# Patient Record
Sex: FEMALE | Race: WHITE | Employment: STUDENT | URBAN - METROPOLITAN AREA
[De-identification: names, ages, dates, MRNs, and addresses within clinical notes are randomized per-mention and may not be internally consistent; named-entity substitution may affect disease eponyms.]

---
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
## 2023-01-11 DIAGNOSIS — R55 VASOVAGAL NEAR SYNCOPE: ICD-10-CM

## 2023-02-05 DIAGNOSIS — R55 VASOVAGAL NEAR SYNCOPE: ICD-10-CM

## 2023-02-17 ENCOUNTER — APPOINTMENT (OUTPATIENT)
Dept: PEDIATRIC CARDIOLOGY | Age: 18
End: 2023-02-17

## 2023-02-17 ENCOUNTER — OFFICE VISIT (OUTPATIENT)
Dept: OBGYN CLINIC | Facility: CLINIC | Age: 18
End: 2023-02-17

## 2023-02-17 VITALS
BODY MASS INDEX: 19.67 KG/M2 | DIASTOLIC BLOOD PRESSURE: 81 MMHG | WEIGHT: 111 LBS | HEIGHT: 63 IN | HEART RATE: 96 BPM | SYSTOLIC BLOOD PRESSURE: 120 MMHG

## 2023-02-17 DIAGNOSIS — N64.4 BREAST PAIN IN FEMALE: Primary | ICD-10-CM

## 2023-02-17 DIAGNOSIS — Z11.3 SCREEN FOR STD (SEXUALLY TRANSMITTED DISEASE): ICD-10-CM

## 2023-02-17 DIAGNOSIS — Z30.09 BIRTH CONTROL COUNSELING: ICD-10-CM

## 2023-02-17 PROCEDURE — 3074F SYST BP LT 130 MM HG: CPT | Performed by: ADVANCED PRACTICE MIDWIFE

## 2023-02-17 PROCEDURE — 3079F DIAST BP 80-89 MM HG: CPT | Performed by: ADVANCED PRACTICE MIDWIFE

## 2023-02-17 PROCEDURE — 3008F BODY MASS INDEX DOCD: CPT | Performed by: ADVANCED PRACTICE MIDWIFE

## 2023-02-17 PROCEDURE — 99213 OFFICE O/P EST LOW 20 MIN: CPT | Performed by: ADVANCED PRACTICE MIDWIFE

## 2023-02-17 RX ORDER — DESOGESTREL AND ETHINYL ESTRADIOL 21-5 (28)
1 KIT ORAL DAILY
Qty: 28 EACH | Refills: 2 | Status: SHIPPED | OUTPATIENT
Start: 2023-02-17 | End: 2024-02-17

## 2023-02-20 ENCOUNTER — OFFICE VISIT (OUTPATIENT)
Dept: PEDIATRIC CARDIOLOGY | Age: 18
End: 2023-02-20

## 2023-02-20 VITALS
SYSTOLIC BLOOD PRESSURE: 109 MMHG | HEIGHT: 64 IN | WEIGHT: 109.57 LBS | BODY MASS INDEX: 18.71 KG/M2 | HEART RATE: 93 BPM | OXYGEN SATURATION: 98 % | DIASTOLIC BLOOD PRESSURE: 62 MMHG

## 2023-02-20 DIAGNOSIS — R55 VASOVAGAL NEAR SYNCOPE: ICD-10-CM

## 2023-02-20 LAB
C TRACH DNA SPEC QL NAA+PROBE: NEGATIVE
N GONORRHOEA DNA SPEC QL NAA+PROBE: NEGATIVE

## 2023-02-20 PROCEDURE — 99214 OFFICE O/P EST MOD 30 MIN: CPT | Performed by: PEDIATRICS

## 2023-02-20 RX ORDER — MIDODRINE HYDROCHLORIDE 5 MG/1
5 TABLET ORAL 3 TIMES DAILY
Qty: 150 TABLET | Refills: 11 | Status: SHIPPED | OUTPATIENT
Start: 2023-02-20 | End: 2023-08-24 | Stop reason: HOSPADM

## 2023-02-20 ASSESSMENT — PAIN SCALES - GENERAL: PAINLEVEL: 0

## 2023-03-28 NOTE — TELEPHONE ENCOUNTER
Mom asking for any advice regarding Covid which her daughter was positive on 12/28. Patient currently with cough, headache, congestion and low grade fever. Advised rest, fluids, alternating Ibuprofen with Tylenol.   Advised UC/ER for worsening symptoms an
show

## 2023-04-13 DIAGNOSIS — R55 VASOVAGAL NEAR SYNCOPE: ICD-10-CM

## 2023-05-26 ENCOUNTER — OFFICE VISIT (OUTPATIENT)
Dept: OBGYN CLINIC | Facility: CLINIC | Age: 18
End: 2023-05-26

## 2023-05-26 VITALS
WEIGHT: 113 LBS | DIASTOLIC BLOOD PRESSURE: 78 MMHG | BODY MASS INDEX: 20 KG/M2 | HEART RATE: 98 BPM | SYSTOLIC BLOOD PRESSURE: 125 MMHG

## 2023-05-26 DIAGNOSIS — I47.1 SVT (SUPRAVENTRICULAR TACHYCARDIA) (HCC): ICD-10-CM

## 2023-05-26 DIAGNOSIS — J45.40 MODERATE PERSISTENT ASTHMA WITHOUT COMPLICATION: ICD-10-CM

## 2023-05-26 DIAGNOSIS — N92.6 MISSED MENSES: Primary | ICD-10-CM

## 2023-05-26 DIAGNOSIS — I34.1 MITRAL VALVE PROLAPSE: ICD-10-CM

## 2023-05-26 PROBLEM — I47.10 SUPRAVENTRICULAR TACHYCARDIA (HCC): Status: ACTIVE | Noted: 2019-04-01

## 2023-05-26 PROBLEM — I47.10 SUPRAVENTRICULAR TACHYCARDIA: Status: ACTIVE | Noted: 2019-04-01

## 2023-05-26 LAB
CONTROL LINE PRESENT WITH A CLEAR BACKGROUND (YES/NO): YES YES/NO
KIT LOT #: NORMAL NUMERIC
PREGNANCY TEST, URINE: POSITIVE

## 2023-05-26 PROCEDURE — 81025 URINE PREGNANCY TEST: CPT | Performed by: ADVANCED PRACTICE MIDWIFE

## 2023-05-26 PROCEDURE — 99212 OFFICE O/P EST SF 10 MIN: CPT | Performed by: ADVANCED PRACTICE MIDWIFE

## 2023-05-26 PROCEDURE — 3078F DIAST BP <80 MM HG: CPT | Performed by: ADVANCED PRACTICE MIDWIFE

## 2023-05-26 PROCEDURE — 3074F SYST BP LT 130 MM HG: CPT | Performed by: ADVANCED PRACTICE MIDWIFE

## 2023-05-26 RX ORDER — PNV NO.95/FERROUS FUM/FOLIC AC 28MG-0.8MG
1 TABLET ORAL DAILY
Qty: 90 TABLET | Refills: 3 | Status: SHIPPED | OUTPATIENT
Start: 2023-05-26 | End: 2023-06-25

## 2023-06-02 DIAGNOSIS — R55 VASOVAGAL NEAR SYNCOPE: ICD-10-CM

## 2023-06-28 ENCOUNTER — TELEPHONE (OUTPATIENT)
Dept: PEDIATRIC CARDIOLOGY | Age: 18
End: 2023-06-28

## 2023-08-21 ENCOUNTER — OFFICE VISIT (OUTPATIENT)
Dept: PEDIATRIC CARDIOLOGY | Age: 18
End: 2023-08-21

## 2023-08-21 VITALS
OXYGEN SATURATION: 98 % | WEIGHT: 122.47 LBS | HEART RATE: 86 BPM | HEIGHT: 64 IN | SYSTOLIC BLOOD PRESSURE: 117 MMHG | DIASTOLIC BLOOD PRESSURE: 65 MMHG | BODY MASS INDEX: 20.91 KG/M2

## 2023-08-21 DIAGNOSIS — R55 VASOVAGAL NEAR SYNCOPE: Primary | ICD-10-CM

## 2023-08-21 PROCEDURE — 99214 OFFICE O/P EST MOD 30 MIN: CPT | Performed by: PEDIATRICS

## 2023-08-21 PROCEDURE — 93000 ELECTROCARDIOGRAM COMPLETE: CPT | Performed by: PEDIATRICS

## 2023-08-21 ASSESSMENT — PAIN SCALES - GENERAL: PAINLEVEL: 0

## 2023-11-27 ENCOUNTER — EXTERNAL RECORD (OUTPATIENT)
Dept: HEALTH INFORMATION MANAGEMENT | Facility: OTHER | Age: 18
End: 2023-11-27

## 2023-12-01 ENCOUNTER — OFFICE VISIT (OUTPATIENT)
Dept: PEDIATRIC CARDIOLOGY | Age: 18
End: 2023-12-01

## 2023-12-01 ENCOUNTER — CLINICAL ABSTRACT (OUTPATIENT)
Dept: HEALTH INFORMATION MANAGEMENT | Facility: OTHER | Age: 18
End: 2023-12-01

## 2023-12-01 VITALS
OXYGEN SATURATION: 100 % | SYSTOLIC BLOOD PRESSURE: 124 MMHG | WEIGHT: 137.35 LBS | HEART RATE: 104 BPM | BODY MASS INDEX: 23.45 KG/M2 | HEIGHT: 64 IN | DIASTOLIC BLOOD PRESSURE: 71 MMHG

## 2023-12-01 DIAGNOSIS — R55 VASOVAGAL NEAR SYNCOPE: Primary | ICD-10-CM

## 2023-12-01 PROCEDURE — 93000 ELECTROCARDIOGRAM COMPLETE: CPT | Performed by: PEDIATRICS

## 2023-12-01 PROCEDURE — 99214 OFFICE O/P EST MOD 30 MIN: CPT | Performed by: PEDIATRICS

## 2024-03-16 ENCOUNTER — HOSPITAL ENCOUNTER (EMERGENCY)
Facility: HOSPITAL | Age: 19
Discharge: HOME | End: 2024-03-16
Attending: EMERGENCY MEDICINE
Payer: MEDICAID

## 2024-03-16 VITALS
RESPIRATION RATE: 20 BRPM | TEMPERATURE: 98 F | OXYGEN SATURATION: 96 % | WEIGHT: 118.19 LBS | DIASTOLIC BLOOD PRESSURE: 80 MMHG | HEART RATE: 88 BPM | BODY MASS INDEX: 21 KG/M2 | SYSTOLIC BLOOD PRESSURE: 137 MMHG

## 2024-03-16 DIAGNOSIS — R59.1 LYMPHADENOPATHY: ICD-10-CM

## 2024-03-16 DIAGNOSIS — J01.90 ACUTE SINUSITIS, RECURRENCE NOT SPECIFIED, UNSPECIFIED LOCATION: ICD-10-CM

## 2024-03-16 DIAGNOSIS — J06.9 UPPER RESPIRATORY TRACT INFECTION, UNSPECIFIED TYPE: Primary | ICD-10-CM

## 2024-03-16 PROCEDURE — 99283 EMERGENCY DEPT VISIT LOW MDM: CPT

## 2024-03-16 RX ORDER — AZITHROMYCIN 250 MG/1
TABLET, FILM COATED ORAL
Qty: 6 TABLET | Refills: 0 | Status: SHIPPED | OUTPATIENT
Start: 2024-03-16 | End: 2024-03-21

## 2024-03-16 NOTE — ED PROVIDER NOTES
Patient Seen in: [facility] Emergency Department    History     Chief Complaint   Patient presents with    Swelling       HPI    18-year-old female who presents here today with her mother, presenting with bumps to her throat and the back of her neck.  Patient she been having runny nose and sore throat for the past few days as well.  Patient she has had these before they come and go.  No injury or trauma.    History reviewed.   Past Medical History:   Diagnosis Date    Acne vulgaris 5/28/2020    Anxiety 1/27/2022    Asthma (HCC)     Depressive disorder 1/27/2022    Headache disorder 9/13/2019    Hyperopia of both eyes 9/13/2019    Mitral valve prolapse     Poor posture 1/23/2020    SVT (supraventricular tachycardia)        History reviewed.   Past Surgical History:   Procedure Laterality Date    ABLATION  2019         Medications :  (Not in a hospital admission)       Family History   Problem Relation Age of Onset    Heart Attack Maternal Grandmother 64    Glaucoma Maternal Grandfather     Glaucoma Paternal Grandmother     Macular degeneration Neg        Smoking Status:   Social History     Socioeconomic History    Marital status: Single   Tobacco Use    Smoking status: Never    Smokeless tobacco: Never    Tobacco comments:     Mother denies passive smoke exposure in the home.    Vaping Use    Vaping Use: Never used   Substance and Sexual Activity    Alcohol use: No    Drug use: No   Other Topics Concern    Second-hand smoke exposure No    Alcohol/drug concerns No    Violence concerns No       Constitutional and vital signs reviewed.      Social History and Family History elements reviewed from today, pertinent positives to the presenting problem noted.    Physical Exam     ED Triage Vitals [03/16/24 0241]   /80   Pulse 88   Resp 20   Temp 97.5 °F (36.4 °C)   Temp src    SpO2 96 %   O2 Device None (Room air)       All measures to prevent infection transmission during my interaction with the patient  were taken. Handwashing was performed prior to and after the exam.  Stethoscope and any equipment used during my examination was cleaned with super sani-cloth germicidal wipes following the exam.     Physical Exam  Vitals reviewed.   Constitutional:       Appearance: Normal appearance.   HENT:      Head: Normocephalic.      Comments: 1 palpable lymph node in the right mid posterior cervical chain.  1 palpable lymph node in the right submental area.  Both are mobile and soft.     Nose: Congestion present.      Mouth/Throat:      Mouth: Mucous membranes are dry.      Comments: Postnasal drip.  No pharyngeal erythema pain oropharynx  Eyes:      Pupils: Pupils are equal, round, and reactive to light.   Cardiovascular:      Rate and Rhythm: Regular rhythm.      Heart sounds: Normal heart sounds.   Pulmonary:      Breath sounds: Normal breath sounds.   Musculoskeletal:         General: Normal range of motion.   Skin:     General: Skin is warm and dry.   Neurological:      General: No focal deficit present.      Mental Status: She is alert.         ED Course      Labs Reviewed - No data to display    As Interpreted by me    Imaging Results Available and Reviewed while in ED: No results found.  ED Medications Administered: Medications - No data to display      MDM     Vitals:    03/16/24 0237 03/16/24 0241   BP:  137/80   Pulse:  88   Resp:  20   Temp:  97.5 °F (36.4 °C)   SpO2:  96%   Weight: 53.6 kg      *I personally reviewed and interpreted all ED vitals.    Pulse Ox: 96%, Room air, Normal     Differential Diagnosis/ Diagnostic Considerations: Lymph nodes, abscess, mass.    Complicating Factors: The patient already has does not have any pertinent problems on file. to contribute to the complexity of this ED evaluation.    Medical Decision Making    I did explain to the patient and her mother-in-law that both when she notices her palpable lymph nodes.  Patient does have upper respiratory infection which is causing the  lymphadenopathy.  Will discharge the patient home with a Z-Brea.  They do understand to follow-up with her primary care provider 1 to 2 days.  Return to the ER if symptoms continue, get worse, unable to follow-up  Condition upon leaving the department: Stable    Disposition and Plan     Clinical Impression:  1. Upper respiratory tract infection, unspecified type    2. Lymphadenopathy        Disposition:  Discharge    Follow-up:  Felipe Boss DO  [address] [place] [address]  [phone]    Follow up        Medications Prescribed:  Current Discharge Medication List        START taking these medications    Details   azithromycin (ZITHROMAX Z-BREA) 250 MG Oral Tab 500 mg once followed by 250 mg daily x 4 days  Qty: 6 tablet, Refills: 0

## 2024-07-05 ENCOUNTER — APPOINTMENT (OUTPATIENT)
Dept: PEDIATRIC CARDIOLOGY | Age: 19
End: 2024-07-05

## 2024-09-02 NOTE — TELEPHONE ENCOUNTER
KALEIGH- Please call and schedule an appointment for patient for a nurse visit 2nd HPV.
Pt's mom called in requesting an appt for pt to receive her second dose of HPV with an RN. Please advise.
Talked to mom of pt appt made.
76

## 2024-11-07 ENCOUNTER — TELEPHONE (OUTPATIENT)
Dept: CARDIOLOGY | Age: 19
End: 2024-11-07

## 2024-11-14 ENCOUNTER — APPOINTMENT (OUTPATIENT)
Age: 19
End: 2024-11-14

## 2024-11-14 ENCOUNTER — ANCILLARY PROCEDURE (OUTPATIENT)
Age: 19
End: 2024-11-14
Attending: INTERNAL MEDICINE

## 2024-11-14 VITALS
DIASTOLIC BLOOD PRESSURE: 70 MMHG | WEIGHT: 115 LBS | SYSTOLIC BLOOD PRESSURE: 134 MMHG | HEART RATE: 83 BPM | BODY MASS INDEX: 19.63 KG/M2 | HEIGHT: 64 IN

## 2024-11-14 DIAGNOSIS — I47.10 SVT (SUPRAVENTRICULAR TACHYCARDIA) (CMD): ICD-10-CM

## 2024-11-14 DIAGNOSIS — R55 SYNCOPE AND COLLAPSE: ICD-10-CM

## 2024-11-14 DIAGNOSIS — R55 SYNCOPE AND COLLAPSE: Primary | ICD-10-CM

## 2024-11-14 LAB
ATRIAL RATE (BPM): 84
P AXIS (DEGREES): 73
PR-INTERVAL (MSEC): 200
QRS-INTERVAL (MSEC): 72
QT-INTERVAL (MSEC): 364
QTC: 430
R AXIS (DEGREES): 68
REPORT TEXT: NORMAL
T AXIS (DEGREES): 57
VENTRICULAR RATE EKG/MIN (BPM): 84

## 2024-11-14 RX ORDER — METOPROLOL TARTRATE 25 MG/1
25 TABLET, FILM COATED ORAL 2 TIMES DAILY
Qty: 60 TABLET | Refills: 1 | Status: SHIPPED | OUTPATIENT
Start: 2024-11-14

## 2024-11-14 ASSESSMENT — PATIENT HEALTH QUESTIONNAIRE - PHQ9
1. LITTLE INTEREST OR PLEASURE IN DOING THINGS: NOT AT ALL
CLINICAL INTERPRETATION OF PHQ2 SCORE: NO FURTHER SCREENING NEEDED
2. FEELING DOWN, DEPRESSED OR HOPELESS: NOT AT ALL
SUM OF ALL RESPONSES TO PHQ9 QUESTIONS 1 AND 2: 0
SUM OF ALL RESPONSES TO PHQ9 QUESTIONS 1 AND 2: 0

## 2024-11-15 ENCOUNTER — TELEPHONE (OUTPATIENT)
Dept: CARDIOLOGY | Age: 19
End: 2024-11-15

## 2024-12-04 ENCOUNTER — TELEPHONE (OUTPATIENT)
Dept: CARDIOLOGY | Age: 19
End: 2024-12-04

## 2024-12-05 ENCOUNTER — HOSPITAL ENCOUNTER (EMERGENCY)
Facility: HOSPITAL | Age: 19
Discharge: HOME | End: 2024-12-05
Attending: EMERGENCY MEDICINE
Payer: MEDICAID

## 2024-12-05 ENCOUNTER — APPOINTMENT (OUTPATIENT)
Dept: CT IMAGING | Facility: HOSPITAL | Age: 19
End: 2024-12-05
Attending: EMERGENCY MEDICINE
Payer: MEDICAID

## 2024-12-05 ENCOUNTER — APPOINTMENT (OUTPATIENT)
Dept: ULTRASOUND IMAGING | Facility: HOSPITAL | Age: 19
End: 2024-12-05
Attending: EMERGENCY MEDICINE
Payer: MEDICAID

## 2024-12-05 VITALS
WEIGHT: 114 LBS | SYSTOLIC BLOOD PRESSURE: 119 MMHG | BODY MASS INDEX: 19.46 KG/M2 | RESPIRATION RATE: 16 BRPM | DIASTOLIC BLOOD PRESSURE: 76 MMHG | HEIGHT: 64 IN | OXYGEN SATURATION: 99 % | TEMPERATURE: 99 F | HEART RATE: 76 BPM

## 2024-12-05 DIAGNOSIS — N83.201 RIGHT OVARIAN CYST: Primary | ICD-10-CM

## 2024-12-05 LAB
ANION GAP SERPL CALC-SCNC: 8 MMOL/L (ref 0–18)
BASOPHILS # BLD AUTO: 0.03 X10(3) UL (ref 0–0.2)
BASOPHILS NFR BLD AUTO: 0.5 %
BILIRUB UR QL: NEGATIVE
BUN BLD-MCNC: 18 MG/DL (ref 9–23)
BUN/CREAT SERPL: 19.1 (ref 10–20)
CALCIUM BLD-MCNC: 9.5 MG/DL (ref 8.7–10.4)
CHLORIDE SERPL-SCNC: 107 MMOL/L (ref 98–112)
CLARITY UR: CLEAR
CO2 SERPL-SCNC: 27 MMOL/L (ref 21–32)
COLOR UR: COLORLESS
CREAT BLD-MCNC: 0.94 MG/DL
DEPRECATED RDW RBC AUTO: 39.7 FL (ref 35.1–46.3)
EGFRCR SERPLBLD CKD-EPI 2021: 89 ML/MIN/1.73M2 (ref 60–?)
EOSINOPHIL # BLD AUTO: 0.17 X10(3) UL (ref 0–0.7)
EOSINOPHIL NFR BLD AUTO: 2.7 %
ERYTHROCYTE [DISTWIDTH] IN BLOOD BY AUTOMATED COUNT: 12.3 % (ref 11–15)
GLUCOSE BLD-MCNC: 87 MG/DL (ref 70–99)
GLUCOSE UR-MCNC: NORMAL MG/DL
HCG SERPL QL: NEGATIVE
HCT VFR BLD AUTO: 38.1 %
HGB BLD-MCNC: 12.7 G/DL
IMM GRANULOCYTES # BLD AUTO: 0.02 X10(3) UL (ref 0–1)
IMM GRANULOCYTES NFR BLD: 0.3 %
KETONES UR-MCNC: NEGATIVE MG/DL
LEUKOCYTE ESTERASE UR QL STRIP.AUTO: NEGATIVE
LYMPHOCYTES # BLD AUTO: 1.67 X10(3) UL (ref 1–4)
LYMPHOCYTES NFR BLD AUTO: 26.3 %
MCH RBC QN AUTO: 29.1 PG (ref 26–34)
MCHC RBC AUTO-ENTMCNC: 33.3 G/DL (ref 31–37)
MCV RBC AUTO: 87.4 FL
MONOCYTES # BLD AUTO: 0.51 X10(3) UL (ref 0.1–1)
MONOCYTES NFR BLD AUTO: 8 %
NEUTROPHILS # BLD AUTO: 3.95 X10 (3) UL (ref 1.5–7.7)
NEUTROPHILS # BLD AUTO: 3.95 X10(3) UL (ref 1.5–7.7)
NEUTROPHILS NFR BLD AUTO: 62.2 %
NITRITE UR QL STRIP.AUTO: NEGATIVE
OSMOLALITY SERPL CALC.SUM OF ELEC: 295 MOSM/KG (ref 275–295)
PH UR: 6.5 [PH] (ref 5–8)
PLATELET # BLD AUTO: 232 10(3)UL (ref 150–450)
POTASSIUM SERPL-SCNC: 4.3 MMOL/L (ref 3.5–5.1)
PROT UR-MCNC: NEGATIVE MG/DL
RBC # BLD AUTO: 4.36 X10(6)UL
SODIUM SERPL-SCNC: 142 MMOL/L (ref 136–145)
SP GR UR STRIP: <1.005 (ref 1–1.03)
UROBILINOGEN UR STRIP-ACNC: NORMAL
WBC # BLD AUTO: 6.4 X10(3) UL (ref 4–11)

## 2024-12-05 PROCEDURE — 84703 CHORIONIC GONADOTROPIN ASSAY: CPT | Performed by: EMERGENCY MEDICINE

## 2024-12-05 PROCEDURE — 76856 US EXAM PELVIC COMPLETE: CPT | Performed by: EMERGENCY MEDICINE

## 2024-12-05 PROCEDURE — 36415 COLL VENOUS BLD VENIPUNCTURE: CPT

## 2024-12-05 PROCEDURE — 99284 EMERGENCY DEPT VISIT MOD MDM: CPT

## 2024-12-05 PROCEDURE — 81001 URINALYSIS AUTO W/SCOPE: CPT | Performed by: EMERGENCY MEDICINE

## 2024-12-05 PROCEDURE — 76830 TRANSVAGINAL US NON-OB: CPT | Performed by: EMERGENCY MEDICINE

## 2024-12-05 PROCEDURE — 99285 EMERGENCY DEPT VISIT HI MDM: CPT

## 2024-12-05 PROCEDURE — 93975 VASCULAR STUDY: CPT | Performed by: EMERGENCY MEDICINE

## 2024-12-05 PROCEDURE — 74177 CT ABD & PELVIS W/CONTRAST: CPT | Performed by: EMERGENCY MEDICINE

## 2024-12-05 PROCEDURE — 85025 COMPLETE CBC W/AUTO DIFF WBC: CPT | Performed by: EMERGENCY MEDICINE

## 2024-12-05 PROCEDURE — 80048 BASIC METABOLIC PNL TOTAL CA: CPT | Performed by: EMERGENCY MEDICINE

## 2024-12-06 NOTE — ED PROVIDER NOTES
Patient Seen in: [facility] Emergency Department      History     Chief Complaint   Patient presents with    Abdominal Pain     Stated Complaint: Abdominal Pain    Subjective:   HPI    18 yoF with history of tachycardia, anxiety, ovarian cyst, who presents for evaluation of right low pelvic pain.  It has been intermittent over the past 4 days.  She also has pain at both sides of her low back.  No dysuria or hematuria.  No fever or chills.  No vomiting but she does have nausea.  Pain is worse with trying to defecate.  No abdominal surgeries.      Objective:     Past Medical History:    Acne vulgaris    Anxiety    Asthma (HCC)    Depressive disorder    Headache disorder    Hyperopia of both eyes    Mitral valve prolapse    Poor posture    SVT (supraventricular tachycardia) ([facility])              Past Surgical History:   Procedure Laterality Date    Ablation  2019                Social History     Socioeconomic History    Marital status: Single   Tobacco Use    Smoking status: Never    Smokeless tobacco: Never    Tobacco comments:     Mother denies passive smoke exposure in the home.    Vaping Use    Vaping status: Never Used   Substance and Sexual Activity    Alcohol use: No    Drug use: No   Other Topics Concern    Second-hand smoke exposure No    Alcohol/drug concerns No    Violence concerns No     Social Drivers of Health     Financial Resource Strain: Not on File (6/3/2023)    [facility] from [facility]    Financial Resource Strain     Financial Resource Strain: 0   Food Insecurity: Not on File (9/26/2024)    Received from [facility]    Food Insecurity     Food: 0   Transportation Needs: Not on File (6/3/2023)    Received from [facility]    Transportation Needs     Transportation: 0   Physical Activity: Not on File (6/3/2023)    Received from [facility]    Physical Activity     Physical Activity: 0   Stress: Not on File (6/3/2023)    Received from [facility]    Stress     Stress: 0   Social Connections: Not  on File (2024)    Received from [facility]    Social Connections     Connectedness: 0   Housing Stability: Not on File (6/3/2023)    Received from [facility]    Housing Stability     Housin                  Physical Exam     ED Triage Vitals [24 1857]   /73   Pulse 75   Resp 18   Temp 99.2 °F (37.3 °C)   Temp src Temporal   SpO2 98 %   O2 Device None (Room air)       Current Vitals:   Vital Signs  BP: 117/73  Pulse: 75  Resp: 18  Temp: 99.2 °F (37.3 °C)  Temp src: Temporal  MAP (mmHg): 88    Oxygen Therapy  SpO2: 98 %  O2 Device: None (Room air)        Physical Exam  Vitals and nursing note reviewed.   Constitutional:       Appearance: She is well-developed.   HENT:      Head: Normocephalic and atraumatic.   Eyes:      Extraocular Movements: Extraocular movements intact.   Cardiovascular:      Rate and Rhythm: Normal rate and regular rhythm.      Heart sounds: Normal heart sounds.   Pulmonary:      Effort: Pulmonary effort is normal.      Breath sounds: Normal breath sounds.   Abdominal:      General: There is no distension.      Palpations: Abdomen is soft.      Tenderness: There is abdominal tenderness. There is no right CVA tenderness or left CVA tenderness.      Comments: Tender to palpation in the right lower pelvis without rebound or guarding, no distention   Musculoskeletal:         General: Normal range of motion.      Cervical back: Normal range of motion.   Skin:     General: Skin is warm.   Neurological:      Mental Status: She is alert.      Comments: No focal deficits     Differential diagnosis includes but is not limited to ovarian cyst versus torsion, ectopic pregnancy, cystitis, appendicitis        ED Course     Labs Reviewed   URINALYSIS WITH CULTURE REFLEX - Abnormal; Notable for the following components:       Result Value    Urine Color Colorless (*)     Spec Gravity <1.005 (*)     Blood Urine 1+ (*)     Bacteria Urine Rare (*)     Squamous Epi. Cells Few (*)     All other  components within normal limits   BASIC METABOLIC PANEL (8) - Normal   HCG, BETA SUBUNIT, QUAL - Normal   CBC WITH DIFFERENTIAL WITH PLATELET       ED Course as of 12/05/24 2121  ------------------------------------------------------------  Time: 12/05 2005  Comment: BMP and CBC unremarkable  ------------------------------------------------------------  Time: 12/05 2005  Comment: Pregnancy test negative  ------------------------------------------------------------  Time: 12/05 2026  Comment: Urinalysis negative for clear UTI   PROCEDURE: CT APPENDIX ABD PEL W CONTRAST (CPT=74177)      COMPARISON: [facility],  ABDOMEN LIMITED (CPT=76705), 3/28/2022, 8:52 AM.      INDICATIONS: eval for appendicitis right lower quadrant pain      TECHNIQUE: CT images of the abdomen and pelvis were obtained with non-ionic intravenous contrast material.  Automated exposure control for dose reduction was used. Adjustment of the mA and/or kV was done based on the patient's size. Use of iterative   reconstruction technique for dose reduction was used.  Dose information is transmitted to the ACR (American College of Radiology) NRDR (National Radiology Data Registry) which includes the Dose Index Registry.      FINDINGS:   Normal lower chest      Normal liver and gallbladder      Normal pancreas and spleen and adrenals      Normal-size kidneys.  4 millimeter stone interpolar right kidney.  No hydronephrosis.  There is urothelial enhancement of the right ureter.      Normal aorta.  No adenopathy or ascites      Unobstructed bowel.  Appendix is not visible.  No secondary signs of appendicitis      Normal bladder.  No adnexal mass.  No bone lesion         CONCLUSION: No secondary signs of appendicitis.  Urothelial enhancement throughout the right ureter such as might be seen with ascending UTI.  Nonobstructing 4 millimeter right renal stone.   Finalized by (CST): Chun Wise MD on 12/05/2024 at 8:39 PM       PROCEDURE: US PELVIS EV W DOPPLER (CPT=76856/55658/74845)      COMPARISON: [facility], US PELVIS (TRANSABDOMINAL PELVIS) (CPT=76856), 3/22/2018, 5:41 PM.      INDICATIONS: R pelvic pain- r/o torsion      TECHNIQUE: Pelvic ultrasound using transabdominal and transvaginal technique. Duplex/color Doppler evaluation of the ovaries for torsion.      FINDINGS: Mildly retroflexed uterus 8.5 x 3.5 x 5.5 centimeter.  No fibroid.  Normal endometrium, 0.5 centimeter thick      Normal size right ovary with small hypoechoic nonvascular nodule, probable small hemorrhagic corpus luteal cyst.  This measures 2.2 x 2.5 centimeter.  There is arterial and venous flow by Doppler and spectral technique.  Normal size and appearing left   ovary with arterial and venous flow by Doppler and spectral technique      No free fluid   CONCLUSION: No evidence of ovarian torsion.  Likely 2.5 cm hemorrhagic corpus luteal cyst in the right ovary   Finalized by (CST): Chun Wise MD on 12/05/2024 at 9:14 PM                  MDM              Medical Decision Making    18 yoF with R lower pelvic pain.  Labs reviewed as above.  Per my independent interpretation of CT, there is no clear appendicitis.  Pelvic ultrasound with hemorrhagic right ovarian cyst, without clear evidence of torsion.  Advise supportive care, ibuprofen as needed for pain and PCP follow-up for further management.  ER return precautions discussed.    Problems Addressed:  Right ovarian cyst: complicated acute illness or injury with systemic symptoms    Amount and/or Complexity of Data Reviewed  Labs: ordered. Decision-making details documented in ED Course.  Radiology: ordered and independent interpretation performed. Decision-making details documented in ED Course.    Risk  OTC drugs.      Disposition and Plan     Clinical Impression:  1. Right ovarian cyst         Disposition:  Discharge  12/5/2024  9:17 pm    Follow-up:  Felipe Boss,   [address] [place] [address]  [address]  [place] [address]  [phone]    Follow up in 1 week(s)            Medications Prescribed:  Current Discharge Medication List              Supplementary Documentation:

## 2024-12-06 NOTE — DISCHARGE INSTRUCTIONS
As discussed, you should take ibuprofen (also called Advil or Motrin), or naproxen (also called Aleve) for your pain.  If you are taking ibuprofen, you can take 600 mg every 6 hours, or 800 mg every 8 hours.  If you are taking naproxen, you can take 500 mg every 12 hours.  Do not take more than this amount as it can cause kidney problems or bleeding into your stomach.  Do not take these medications at the same time as it can increase your risk for the side effects.  If you have a history of heart problems or have had uncontrolled blood pressure for a significant period of time, he should not take these medications as it can increase your risk for heart attack or stroke.

## 2024-12-06 NOTE — ED INITIAL ASSESSMENT (HPI)
Pt arrives through triage with       complaints of right lower abdomen pain that radiates to her left lower abdomen.pt states she has a cyst on right ovary. Pain has been persistent since 4 days ago.    Pt denies vaginal bleeding

## 2024-12-10 ENCOUNTER — APPOINTMENT (OUTPATIENT)
Dept: CARDIOLOGY | Age: 19
End: 2024-12-10
Attending: INTERNAL MEDICINE

## 2024-12-10 DIAGNOSIS — I47.10 SVT (SUPRAVENTRICULAR TACHYCARDIA) (CMD): ICD-10-CM

## 2024-12-10 DIAGNOSIS — R55 SYNCOPE AND COLLAPSE: ICD-10-CM

## 2024-12-10 LAB
AORTIC VALVE AREA (AVA): 0.78
AV PEAK GRADIENT (AVPG): 6
AV PEAK VELOCITY (AVPV): 1.26
AV STENOSIS SEVERITY TEXT: NORMAL
AVI LVOT PEAK GRADIENT (LVOTMG): 0.7
E WAVE DECELARATION TIME (MDT): 9.13
LEFT INTERNAL DIMENSION IN SYSTOLE (LVSD): 0.7
LEFT VENTRICULAR INTERNAL DIMENSION IN DIASTOLE (LVDD): 2.9
LEFT VENTRICULAR POSTERIOR WALL IN END DIASTOLE (LVPW): 4.5
LV EF: NORMAL %
MV E TISSUE VEL MED (MESV): 12.7
MV E WAVE VEL/E TISSUE VEL MED(MSR): 8.58
MV PEAK A VELOCITY (MVPAV): 200
MV PEAK E VELOCITY (MVPEV): 0.57
RV END SYSTOLIC LONGITUDINAL STRAIN FREE WALL (RVGS): 2
TV ESTIMATED RIGHT ARTERIAL PRESSURE (RAP): 11.7

## 2024-12-10 PROCEDURE — 93306 TTE W/DOPPLER COMPLETE: CPT | Performed by: INTERNAL MEDICINE

## 2024-12-17 ENCOUNTER — TELEPHONE (OUTPATIENT)
Age: 19
End: 2024-12-17

## 2025-01-07 ENCOUNTER — OFFICE VISIT (OUTPATIENT)
Dept: FAMILY MEDICINE CLINIC | Facility: CLINIC | Age: 20
End: 2025-01-07

## 2025-01-07 VITALS
HEIGHT: 64 IN | TEMPERATURE: 97 F | WEIGHT: 110 LBS | DIASTOLIC BLOOD PRESSURE: 80 MMHG | BODY MASS INDEX: 18.78 KG/M2 | SYSTOLIC BLOOD PRESSURE: 127 MMHG | HEART RATE: 88 BPM

## 2025-01-07 DIAGNOSIS — Z00.00 ADULT GENERAL MEDICAL EXAM: Primary | ICD-10-CM

## 2025-01-07 DIAGNOSIS — Z12.4 CERVICAL CANCER SCREENING: ICD-10-CM

## 2025-01-07 DIAGNOSIS — I47.10 SVT (SUPRAVENTRICULAR TACHYCARDIA) (HCC): ICD-10-CM

## 2025-01-07 PROCEDURE — 99395 PREV VISIT EST AGE 18-39: CPT | Performed by: FAMILY MEDICINE

## 2025-01-07 NOTE — PROGRESS NOTES
Patient ID: Dyan Jackson is a 19 year old female.    HPI  Chief Complaint   Patient presents with    Routine Physical     Last physical done on 07/29/2021.    Pt has a one year old child.     I reviewed pt's labs and vitals. She delivered her baby at [facility] in [place], she followed up once with them and has not consulted with a gynecologist since then. I gave her a referral. Pt has not recently been sexually active. She is still taking metoprolol per cardiology, and consults with them regularly. Pt has no anemia, which was checked in December, 2024. CMP, kidney, and sugar levels were all normal in December as well.     Pt feels healthy and has no concerns at this time.     Health Maintenance   Topic Date Due    Annual Physical  Never done    Pneumococcal Vaccine: Birth to 64yrs (1 of 1 - PPSV23 or PCV20) 12/06/2009    Asthma Control Test  06/01/2017    COVID-19 Vaccine (5 - 2024-25 season) 09/01/2024    Influenza Vaccine (1) 10/01/2024    Pap Smear  Never done    Annual Depression Screening  01/01/2025    DTaP,Tdap,and Td Vaccines (8 - Td or Tdap) 08/15/2027    Hepatitis B Vaccines  Completed    MMR Vaccines  Completed    Meningococcal Vaccine  Completed    HPV Vaccines  Completed    Hepatitis A Vaccines  Aged Out       =======================================================    Lab Results   Component Value Date    WBC 6.4 12/05/2024    RBC 4.36 12/05/2024    HGB 12.7 12/05/2024    HCT 38.1 12/05/2024    .0 12/05/2024    MPV 7.9 03/22/2018    MCV 87.4 12/05/2024    MCH 29.1 12/05/2024    MCHC 33.3 12/05/2024    RDW 12.3 12/05/2024    NEPRELIM 3.95 12/05/2024    NEPERCENT 62.2 12/05/2024    LYPERCENT 26.3 12/05/2024    MOPERCENT 8.0 12/05/2024    EOPERCENT 2.7 12/05/2024    BAPERCENT 0.5 12/05/2024    NE 3.95 12/05/2024    LYMABS 1.67 12/05/2024    MOABSO 0.51 12/05/2024    EOABSO 0.17 12/05/2024    BAABSO 0.03 12/05/2024       Lab Results   Component Value Date    GLU 87 12/05/2024    BUN 18  12/05/2024    BUNCREA 19.1 12/05/2024    CREATSERUM 0.94 12/05/2024    ANIONGAP 8 12/05/2024    GFRNAA 85 05/23/2019    GFRAA 85 05/23/2019    CA 9.5 12/05/2024    OSMOCALC 295 12/05/2024    ALKPHO 87 06/20/2018    AST 18 06/20/2018    ALT 9 (L) 06/20/2018    BILT 0.7 06/20/2018    TP 6.8 06/20/2018    ALB 4.3 06/20/2018    GLOBULIN 2.5 06/20/2018     12/05/2024    K 4.3 12/05/2024     12/05/2024    CO2 27.0 12/05/2024       Lab Results   Component Value Date    GLU 87 12/05/2024    BUN 18 12/05/2024    CREATSERUM 0.94 12/05/2024    BUNCREA 19.1 12/05/2024    ANIONGAP 8 12/05/2024    GFRAA 85 05/23/2019    GFRNAA 85 05/23/2019    CA 9.5 12/05/2024     12/05/2024    K 4.3 12/05/2024     12/05/2024    CO2 27.0 12/05/2024    OSMOCALC 295 12/05/2024       Lab Results   Component Value Date    COLORUR Colorless (A) 12/05/2024    CLARITY Clear 12/05/2024    SPECGRAVITY <1.005 (L) 12/05/2024    GLUUR Normal 12/05/2024    BILUR Negative 12/05/2024    KETUR Negative 12/05/2024    BLOODURINE 1+ (A) 12/05/2024    PHURINE 6.5 12/05/2024    PROUR Negative 12/05/2024    UROBILINOGEN Normal 12/05/2024    NITRITE Negative 12/05/2024    LEUUR Negative 12/05/2024    NMIC Microscopic not indicated 03/22/2018    WBCUR 1-5 12/05/2024    RBCUR 0-2 12/05/2024    EPIUR Few (A) 12/05/2024    BACUR Rare (A) 12/05/2024    CAOXUR Moderate (A) 09/15/2022     Lab Results   Component Value Date    A1C 5.3 06/20/2018       Lab Results   Component Value Date    CHOLEST 147 06/20/2018    TRIG 64 06/20/2018    HDL 61 06/20/2018    LDL 73 06/20/2018    NONHDLC 86 06/20/2018     TSH (uIU/mL)   Date Value   06/20/2018 0.89       Lab Results   Component Value Date    B12 371 06/20/2018       =======================================================    Wt Readings from Last 6 Encounters:   01/07/25 110 lb   12/05/24 114 lb   03/16/24 118 lb 2.7 oz   05/26/23 113 lb (21%, Z= -0.81)*   02/17/23 111 lb (18%, Z= -0.91)*   11/14/22 111 lb  (19%, Z= -0.88)*     * Growth percentiles are based on CDC (Girls, 2-20 Years) data.               BMI Readings from Last 6 Encounters:   01/07/25 18.88 kg/m²   12/05/24 19.57 kg/m²   03/16/24 20.93 kg/m²   05/26/23 20.02 kg/m² (28%, Z= -0.57)*   02/17/23 19.66 kg/m² (24%, Z= -0.70)*   11/14/22 18.47 kg/m² (11%, Z= -1.24)*     * Growth percentiles are based on CDC (Girls, 2-20 Years) data.       BP Readings from Last 6 Encounters:   01/07/25 127/80   12/05/24 119/76   03/16/24 137/80   05/26/23 125/78   02/17/23 120/81   11/14/22 122/74         Review of Systems   Respiratory:  Negative for shortness of breath.    Cardiovascular:  Negative for leg swelling.         Past Medical History:    Acne vulgaris    Anxiety    Asthma (HCC)    Depressive disorder    Headache disorder    Hyperopia of both eyes    Mitral valve prolapse    Poor posture    SVT (supraventricular tachycardia) ([facility])       Past Surgical History:   Procedure Laterality Date    Ablation  2019       Social History     Socioeconomic History    Marital status: Single     Spouse name: Not on file    Number of children: Not on file    Years of education: Not on file    Highest education level: Not on file   Occupational History    Not on file   Tobacco Use    Smoking status: Never    Smokeless tobacco: Never    Tobacco comments:     Mother denies passive smoke exposure in the home.    Vaping Use    Vaping status: Never Used   Substance and Sexual Activity    Alcohol use: No    Drug use: No    Sexual activity: Not on file   Other Topics Concern    Second-hand smoke exposure No    Alcohol/drug concerns No    Violence concerns No   Social History Narrative    Not on file     Social Drivers of Health     Financial Resource Strain: Not on File (6/3/2023)    Received from [facility]    Financial Resource Strain     Financial Resource Strain: 0   Food Insecurity: Not on File (9/26/2024)    Received from [facility]    Food Insecurity     Food: 0   Transportation Needs:  Not on File (6/3/2023)    Received from [facility]    Transportation Needs     Transportation: 0   Physical Activity: Not on File (6/3/2023)    Received from [facility]    Physical Activity     Physical Activity: 0   Stress: Not on File (6/3/2023)    Received from [facility]    Stress     Stress: 0   Social Connections: Not on File (2024)    Received from [facility]    Social Connections     Connectedness: 0   Housing Stability: Not on File (6/3/2023)    Received from [facility]    Housing Stability     Housin          Current Outpatient Medications   Medication Sig Dispense Refill    metoprolol Tartrate 25 MG Oral Tab Take 1 tablet (25 mg total) by mouth 2 (two) times daily. Do not take if dizziness or heart rate less than 80.(By cardiology) 30 tablet 0            Allergies:Allergies[1]   PHYSICAL EXAM:   Physical Exam      Physical Exam   Constitutional: . She appears well-developed and well-nourished. No distress.   Head: Normocephalic.   Right Ear: Tympanic membrane and ear canal normal.   Left Ear: Tympanic membrane and ear canal normal.   Nose: No mucosal edema or rhinorrhea.  Mouth/Throat: Oropharynx is clear and moist and mucous membranes are normal.   Eyes: Conjunctivae and EOM are normal. Pupils are equal, round, and reactive to light.   Neck: Normal range of motion. Neck supple. No thyromegaly present.   Cardiovascular: Normal rate, regular rhythm and no murmur heard.   Pulmonary/Chest: Effort normal and breath sounds normal. No respiratory distress.   Abdominal: Soft. Bowel sounds are normal. There is no hepatosplenomegaly. There is no tenderness.   Lymphadenopathy: She has no cervical adenopathy.   Neurological: She is alert and oriented to person, place, and time. She has normal reflexes. No cranial nerve deficit.   Skin: Skin is warm and dry. No rash noted.   Psychiatric: She has a normal mood and affect.  Lower legs: No edema of the legs bilaterally.     Vitals reviewed.    Blood pressure  127/80, pulse 88, temperature 97.1 °F (36.2 °C), temperature source Temporal, height 5' 4\" (1.626 m), weight 110 lb, last menstrual period 01/01/2025, unknown if currently breastfeeding.         ASSESSMENT/PLAN:     Diagnoses and all orders for this visit:    Adult general medical exam  -     Assay, Thyroid Stim Hormone; Future  -     AST (SGOT); Future  -     ALT(SGPT); Future  -     Lipid Panel; Future  Reviewed the labs that were done in December.  She will come back fasting to do the labs I ordered.  Cervical cancer screening  -     OBG - INTERNAL  See gynecology  SVT (supraventricular tachycardia) (HCC)  Takes metoprolol which helps and she also follows up with cardiology.      Referrals (if applicable)  Orders Placed This Encounter   Procedures    OBG - INTERNAL     OB/GYN.     Referral Priority:   Routine     Referral Type:   OFFICE VISIT     Referred to Provider:   Sumaya Butts PA-C     Requested Specialty:   OBSTETRICS & GYNECOLOGY     Number of Visits Requested:   3       Follow up if symptoms persist.  Take medicine (if given) as prescribed.  Approach to treatment discussed and patient/family member understands and agrees to plan.     No follow-ups on file.    There are no Patient Instructions on file for this visit.    Martha Ballard    1/7/2025    By signing my name below, IMartha,  attest that this documentation has been prepared under the direction and in the presence of Felipe Boss DO.   Electronically Signed: Martha Ballard, 1/7/2025, 1:17 PM.    I, Felipe Boss DO,  personally performed the services described in this documentation. All medical record entries made by the scribe were at my direction and in my presence.  I have reviewed the chart and discharge instructions (if applicable) and agree that the record reflects my personal performance and is accurate and complete.  Feliep Boss DO, 1/7/2025, 1:47 PM              [1]   Allergies  Allergen Reactions     Omeprazole RASH and HIVES    Penicillins ANAPHYLAXIS, RASH and SHORTNESS OF BREATH

## 2025-01-21 ENCOUNTER — APPOINTMENT (OUTPATIENT)
Age: 20
End: 2025-01-21

## 2025-01-21 ENCOUNTER — OFFICE VISIT (OUTPATIENT)
Age: 20
End: 2025-01-21

## 2025-01-21 VITALS
SYSTOLIC BLOOD PRESSURE: 110 MMHG | DIASTOLIC BLOOD PRESSURE: 71 MMHG | WEIGHT: 116.73 LBS | HEIGHT: 64 IN | BODY MASS INDEX: 19.93 KG/M2 | HEART RATE: 80 BPM | OXYGEN SATURATION: 98 %

## 2025-01-21 DIAGNOSIS — I47.10 SVT (SUPRAVENTRICULAR TACHYCARDIA) (CMD): Primary | ICD-10-CM

## 2025-01-21 DIAGNOSIS — R06.02 SOB (SHORTNESS OF BREATH): ICD-10-CM

## 2025-01-21 DIAGNOSIS — R55 SYNCOPE AND COLLAPSE: ICD-10-CM

## 2025-01-21 DIAGNOSIS — R00.2 PALPITATIONS: ICD-10-CM

## 2025-01-21 DIAGNOSIS — R07.89 ATYPICAL CHEST PAIN: ICD-10-CM

## 2025-01-21 PROCEDURE — 99214 OFFICE O/P EST MOD 30 MIN: CPT | Performed by: INTERNAL MEDICINE

## 2025-01-21 PROCEDURE — G2211 COMPLEX E/M VISIT ADD ON: HCPCS | Performed by: INTERNAL MEDICINE

## 2025-01-21 RX ORDER — SODIUM CHLORIDE 1 G/1
1 TABLET ORAL 2 TIMES DAILY
Qty: 60 TABLET | Refills: 1 | Status: SHIPPED | OUTPATIENT
Start: 2025-01-21

## 2025-01-21 ASSESSMENT — PATIENT HEALTH QUESTIONNAIRE - PHQ9
2. FEELING DOWN, DEPRESSED OR HOPELESS: NOT AT ALL
SUM OF ALL RESPONSES TO PHQ9 QUESTIONS 1 AND 2: 0
CLINICAL INTERPRETATION OF PHQ2 SCORE: NO FURTHER SCREENING NEEDED
1. LITTLE INTEREST OR PLEASURE IN DOING THINGS: NOT AT ALL
SUM OF ALL RESPONSES TO PHQ9 QUESTIONS 1 AND 2: 0

## 2025-01-22 ENCOUNTER — OFFICE VISIT (OUTPATIENT)
Dept: OBGYN CLINIC | Facility: CLINIC | Age: 20
End: 2025-01-22

## 2025-01-22 VITALS
HEART RATE: 78 BPM | WEIGHT: 116.63 LBS | BODY MASS INDEX: 20 KG/M2 | DIASTOLIC BLOOD PRESSURE: 71 MMHG | SYSTOLIC BLOOD PRESSURE: 117 MMHG

## 2025-01-22 DIAGNOSIS — Z01.419 WELL WOMAN EXAM WITH ROUTINE GYNECOLOGICAL EXAM: Primary | ICD-10-CM

## 2025-01-22 PROCEDURE — 99395 PREV VISIT EST AGE 18-39: CPT | Performed by: OBSTETRICS & GYNECOLOGY

## 2025-01-22 RX ORDER — METOPROLOL TARTRATE 25 MG/1
25 TABLET, FILM COATED ORAL 2 TIMES DAILY
Qty: 180 TABLET | Refills: 3 | Status: SHIPPED | OUTPATIENT
Start: 2025-01-22

## 2025-01-22 NOTE — PROGRESS NOTES
Dyan Jackson is a 19 year old female  Patient's last menstrual period was 2025 (exact date).   Chief Complaint   Patient presents with    Gyn Exam     ANNUAL EXAM    Presenting for well woman exam. No pap smear history due to age. Monthly menses with normal flow. Had Liletta placed after , but fell out last year. Not sexually active, declines STD testing or BC.     OBSTETRICS HISTORY:  OB History    Para Term  AB Living   1 1 1 0 0 1   SAB IAB Ectopic Multiple Live Births   0 0 0 0 1       GYNE HISTORY:  Patient's last menstrual period was 2025 (exact date).    History   Sexual Activity    Sexual activity: Not on file               MEDICAL HISTORY:  Past Medical History:    Acne vulgaris    Anxiety    Asthma ([facility])    Depressive disorder    Headache disorder    Hyperopia of both eyes    Mitral valve prolapse    Poor posture    SVT (supraventricular tachycardia) ([facility])         SURGICAL HISTORY:  Past Surgical History:   Procedure Laterality Date    Ablation         SOCIAL HISTORY:  Social History     Socioeconomic History    Marital status: Single     Spouse name: Not on file    Number of children: Not on file    Years of education: Not on file    Highest education level: Not on file   Occupational History    Not on file   Tobacco Use    Smoking status: Never    Smokeless tobacco: Never    Tobacco comments:     Mother denies passive smoke exposure in the home.    Vaping Use    Vaping status: Never Used   Substance and Sexual Activity    Alcohol use: No    Drug use: No    Sexual activity: Not on file   Other Topics Concern    Second-hand smoke exposure No    Alcohol/drug concerns No    Violence concerns No   Social History Narrative    Not on file     Social Drivers of Health     Financial Resource Strain: Not on File (6/3/2023)    Received from [facility]    Financial Resource Strain     Financial Resource Strain: 0   Food Insecurity: Not on File (2024)    Received from  [facility]    Food Insecurity     Food: 0   Transportation Needs: Not on File (6/3/2023)    Received from [facility]    Transportation Needs     Transportation: 0   Physical Activity: Not on File (6/3/2023)    Received from [facility]    Physical Activity     Physical Activity: 0   Stress: Not on File (6/3/2023)    Received from [facility]    Stress     Stress: 0   Social Connections: Not on File (2024)    Received from [facility]    Social Connections     Connectedness: 0   Housing Stability: Not on File (6/3/2023)    Received from [facility]    Housing Stability     Housin         Depression Screening (PHQ-2/PHQ-9): Over the LAST 2 WEEKS   Little interest or pleasure in doing things (over the last two weeks)?: Not at all    Feeling down, depressed, or hopeless (over the last two weeks)?: Not at all    PHQ-2 SCORE: 0           MEDICATIONS:    Current Outpatient Medications:     VENTOLIN  (90 Base) MCG/ACT Inhalation Aero Soln, Inhale 2 puffs into the lungs every 6 (six) hours as needed for Wheezing., Disp: 1 Inhaler, Rfl: 1    metoprolol Tartrate 25 MG Oral Tab, Take 1 tablet (25 mg total) by mouth 2 (two) times daily. Do not take if dizziness or heart rate less than 80.(By cardiology), Disp: 30 tablet, Rfl: 0    ALLERGIES:  Allergies[1]      Review of Systems:  Review of Systems   All other systems reviewed and are negative.       Vitals:    25 1517   BP: 117/71   Pulse: 78       PHYSICAL EXAM:   Physical Exam  Vitals reviewed.   Constitutional:       Appearance: Normal appearance.   HENT:      Head: Atraumatic.   Eyes:      Pupils: Pupils are equal, round, and reactive to light.   Pulmonary:      Effort: Pulmonary effort is normal.   Chest:   Breasts:     Right: Normal. No bleeding, inverted nipple, mass, nipple discharge, skin change or tenderness.      Left: Normal. No bleeding, inverted nipple, mass, nipple discharge, skin change or tenderness.   Abdominal:      General: Abdomen is flat.       Palpations: Abdomen is soft.      Tenderness: There is no abdominal tenderness.   Genitourinary:     General: Normal vulva.      Exam position: Lithotomy position.      Labia:         Right: No rash, tenderness, lesion or injury.         Left: No rash, tenderness, lesion or injury.       Vagina: Normal.      Cervix: Normal.      Uterus: Normal. Not tender.       Adnexa: Right adnexa normal and left adnexa normal.        Right: No tenderness or fullness.          Left: No tenderness or fullness.     Lymphadenopathy:      Upper Body:      Right upper body: No supraclavicular, axillary or pectoral adenopathy.      Left upper body: No supraclavicular, axillary or pectoral adenopathy.   Skin:     General: Skin is warm and dry.   Neurological:      General: No focal deficit present.      Mental Status: She is alert and oriented to person, place, and time.   Psychiatric:         Mood and Affect: Mood normal.         Behavior: Behavior normal.         Thought Content: Thought content normal.         Judgment: Judgment normal.           Assessment & Plan:  Dyan was seen today for gyn exam.    Diagnoses and all orders for this visit:    Well woman exam with routine gynecological exam  -     ThinPrep PAP with HPV Reflex Request B        Requested Prescriptions      No prescriptions requested or ordered in this encounter       Pap smear collected. Encourage SBE. Recommend exams yearly.            [1]   Allergies  Allergen Reactions    Omeprazole RASH and HIVES    Penicillins ANAPHYLAXIS, RASH and SHORTNESS OF BREATH

## 2025-02-14 ENCOUNTER — APPOINTMENT (OUTPATIENT)
Dept: PEDIATRIC CARDIOLOGY | Age: 20
End: 2025-02-14

## 2025-03-05 ENCOUNTER — LAB ENCOUNTER (OUTPATIENT)
Dept: LAB | Age: 20
End: 2025-03-05
Attending: FAMILY MEDICINE
Payer: MEDICAID

## 2025-03-05 DIAGNOSIS — Z00.00 ADULT GENERAL MEDICAL EXAM: ICD-10-CM

## 2025-03-05 LAB
ALT SERPL-CCNC: 20 U/L
AST SERPL-CCNC: 23 U/L (ref ?–34)
CHOLEST SERPL-MCNC: 163 MG/DL (ref ?–200)
FASTING PATIENT LIPID ANSWER: YES
HDLC SERPL-MCNC: 60 MG/DL (ref 40–59)
LDLC SERPL CALC-MCNC: 86 MG/DL (ref ?–100)
NONHDLC SERPL-MCNC: 103 MG/DL (ref ?–130)
TRIGL SERPL-MCNC: 91 MG/DL (ref 30–149)
TSI SER-ACNC: 2.17 UIU/ML (ref 0.55–4.78)
VLDLC SERPL CALC-MCNC: 14 MG/DL (ref 0–30)

## 2025-03-05 PROCEDURE — 84443 ASSAY THYROID STIM HORMONE: CPT

## 2025-03-05 PROCEDURE — 36415 COLL VENOUS BLD VENIPUNCTURE: CPT

## 2025-03-05 PROCEDURE — 84450 TRANSFERASE (AST) (SGOT): CPT

## 2025-03-05 PROCEDURE — 84460 ALANINE AMINO (ALT) (SGPT): CPT

## 2025-03-05 PROCEDURE — 80061 LIPID PANEL: CPT

## 2025-07-13 ENCOUNTER — TELEPHONE (OUTPATIENT)
Dept: FAMILY MEDICINE CLINIC | Facility: CLINIC | Age: 20
End: 2025-07-13

## 2025-07-13 NOTE — TELEPHONE ENCOUNTER
Spoke with patient about r/s visit.  Service seemed to be spotty & was breaking up.  Visit r/s to  7/22/25.

## (undated) NOTE — ED AVS SNAPSHOT
[facility] Emergency Department    [facility] [address]     [facility] [address]    Phone:  [phone]    Fax:  [address]   MRN: [id number]    Department:  [facility] Emergency Department   Date of Visit:  2/9/2017 and Class Registration line at [phone] or find a doctor online by visiting [email or web address].    IF THERE IS ANY CHANGE OR WORSENING OF YOUR CONDITION, CALL YOUR PRIMARY CARE PHYSICIAN AT ONCE OR RETURN IMMEDIATELY TO [address] [place] [address].     If

## (undated) NOTE — ED AVS SNAPSHOT
[facility] Emergency Department    [address]     [place] [address]    Phone:  [phone]    Fax:  [phone]   MRN: [id number]    Department:  [facility] Emergency Department   Date of Visit:  2/9/2017 Generic drug:  DiphenhydrAMINE HCl            Where to Get Your Medications      You can get these medications from any pharmacy     Bring a paper prescription for each of these medications    - Promethazine HCl 6.25 MG/5ML Soln              Discharge Inst primary care or specialist physician may see patients referred from the [facility] Emergency Department. Follow-up care is at the discretion of that Physician.   If you need additional assistance selecting a physician, you may call the Lanie [facility] Additional Information       We are concerned for your overall well being:    - If you are a smoker or have smoked in the last 12 months, we encourage you to explore options for quitting.     - If you have concerns related to behavioral health issues or th

## (undated) NOTE — LETTER
VACCINE ADMINISTRATION RECORD  PARENT / GUARDIAN APPROVAL  Date: 2021  Vaccine administered to:  Naina Aguilar     : 2005    MRN: [id number]    A copy of the appropriate Centers for Disease Control and Prevention Vaccine Information statement ha

## (undated) NOTE — ED AVS SNAPSHOT
Ayala Nava   MRN: [id number]    Department:  [facility] Emergency Department   Date of Visit:  3/22/2018           Disclosure     Insurance plans vary and the physician(s) referred by the ER may not be covered by your plan.  Please contact you CARE PHYSICIAN AT ONCE OR RETURN IMMEDIATELY TO THE EMERGENCY DEPARTMENT. If you have been prescribed any medication(s), please fill your prescription right away and begin taking the medication(s) as directed.   If you believe that any of the medications

## (undated) NOTE — MR AVS SNAPSHOT
[address]  [address] [facility]  [phone]               Thank you for choosing us for your health care visit with Rayne Sawant DO.   We are glad to serve you and happy to provide you with this summary Medical Issues Discussed Today     Chest wall tenderness    -  Primary    Mass of sternum          Instructions and Information about Your Health     None      Allergies as of Mar 09, 2017     Penicillins                 Today's Vital Signs     BP Pulse Te often. Sometimes toddlers need to try a food 10 times before they actually accept and enjoy it. It is also important to encourage play time as soon as they start crawling and walking.  As your children grow, continue to help them live a healthy active lifes

## (undated) NOTE — LETTER
VACCINE ADMINISTRATION RECORD  PARENT / GUARDIAN APPROVAL  Date: 2017  Vaccine administered to:  Lou Anne     : 2005    MRN: [id number]    A copy of the appropriate Centers for Disease Control and Prevention Vaccine Information statement has

## (undated) NOTE — ED AVS SNAPSHOT
Parent/Legal Guardian Access to the [email or web address] Record of a Patient 15to 16Years Old  Return completed form by Secure email to [facility] HIM/Medical Records Department: [email or web address]     Requirements and Procedures   Under [facility] MyChart ID and password with another person, that person may be able to view my or my child’s health information, and health information about someone who has authorized me as a MyChart proxy.    ·  I agree that it is my responsibility to select a confident Sign-Up Form and I agree to its terms.        Authorization Form     Please enter Patient’s information below:   Name (last, first, middle initial) __________________________________________   Gender  Male  Female    Last 4 Digits of Social Security Number Parent/Legal Guardian Signature                                  For Patient (1517 years of age)  I agree to allow my parent/legal guardian, named above, online access to my medical information currently available and that may become available as a result

## (undated) NOTE — LETTER
September 13, 2019    Nayeli Syed DO  [address]   [phone]     Patient:  Pj West   YOB: 2005   Date of Visit: 9/13/2019       Dear Dr. Kenna Briones DO:    Thank you for referring Supa Urbano to me for evaluatio into the lungs every 6 (six) hours as needed for Wheezing. Disp: 1 Inhaler Rfl: 1   aspirin 81 MG Oral Chew Tab Chew by mouth daily.  Disp:  Rfl:        Allergies:    Omeprazole              RASH  Penicillins                 ROS:       PHYSICAL EXAM:     Ba Return in about 1 year (around 9/13/2020), or if symptoms worsen or fail to improve, for Complete exam.    9/13/2019  Scribed by: Jada Gamez. Tri Vickers MD      If you have questions, please do not hesitate to call me.  I look forward to following Matty rueda w

## (undated) NOTE — LETTER
1/17/2018          To Whom It May Concern: Brett Cook is currently under my medical care and may not return to school at this time. Please excuse Soha Marinelli for 1 days. She may return to school on January 19, 2019.   Activity is restricted as follows: none

## (undated) NOTE — LETTER
[facility] [employer] of Child Health Examination       Student's Name  Augustgretchen Lomeli Birth Date  1 Title                           Date    (If adding dates to the above immunization history section, put your initials by date(s) and sign here.)   ALTERNATIVE PROOF OF IMMUNITY   1.Clinical diagnosis (measles, mumps, hepatits B) is allowed when verified b Loss of function of one of paired organs? (eye/ear/kidney/testicle)   Yes   No      Birth Defects? Developmental delay? Yes   No    Yes   No  Hospitalizations? When? What for? Yes   No    Blood disorders? Hemophilia, Sickle Cell, Other? Explain. Resistance (hypertension, dyslipidemia, polycystic ovarian syndrome, acanthosis nigricans)    No           At Risk  No   Lead Risk Questionnaire  Req'd for children 6 months thru 6 yrs enrolled in licensed or public school operated day care, ,  nu NEEDS/MODIFICATIONS required in the school setting  None DIETARY Needs/Restrictions     None   SPECIAL INSTRUCTIONS/DEVICES e.g. safety glasses, glass eye, chest protector for arrhythmia, pacemaker, prosthetic device, dental bridge, false teeth, athleticsu

## (undated) NOTE — ED AVS SNAPSHOT
Christen Romeo   MRN: [id number]    Department:  [facility] Emergency Department   Date of Visit:  5/23/2019           Disclosure     Insurance plans vary and the physician(s) referred by the ER may not be covered by your plan.  Please contact you CARE PHYSICIAN AT ONCE OR RETURN IMMEDIATELY TO THE EMERGENCY DEPARTMENT. If you have been prescribed any medication(s), please fill your prescription right away and begin taking the medication(s) as directed.   If you believe that any of the medications

## (undated) NOTE — LETTER
[facility] [employer] Child [employer] Examination       Student's Name  Jayna Broussard Birth Date Signature           7/29/2023                                                                                                                           Title                           Date     Signature Female School   Grade Level/ID#  12th Grade   HEALTH HISTORY          TO BE COMPLETED AND SIGNED BY PARENT/GUARDIAN AND VERIFIED BY HEALTH CARE PROVIDER    ALLERGIES  (Food, drug, insect, other) MEDICATION  (List all prescribed or taken on a regular basis. °C) (Temporal)   Ht 5' 5\" (1.651 m)   Wt 117 lb 12.8 oz (53.4 kg)   LMP 07/25/2021 (Approximate)   BMI 19.60 kg/m²     DIABETES SCREENING  BMI>85% age/sex  No And any two of the following:  Family History No   Ethnic Minority  No          Signs of Insulin No Mental Health Yes        Currently Prescribed Asthma Medication:            Quick-relief  medication (e.g. Short Acting Beta Antagonist): No          Controller medication (e.g. inhaled corticosteroid):   No Other   NEEDS/MODIFICATIONS required in the s

## (undated) NOTE — LETTER
[address] [place] [address]  Dept: [phone]  Dept Fax: [phone]  Loc: [phone]      February 15, 2018    Patient: Manuela Yang   Date of Visit: 2/15/2018       To Whom It May Concern:     Neftaly Cifuentes

## (undated) NOTE — LETTER
[facility] [employer] Child [employer] Examination       Student's Name  Carlos Trever Birth Date Title                           Date     Signature                                                                                                                                              Title HEALTH HISTORY          TO BE COMPLETED AND SIGNED BY PARENT/GUARDIAN AND VERIFIED BY HEALTH CARE PROVIDER    ALLERGIES  (Food, drug, insect, other) MEDICATION  (List all prescribed or taken on a regular basis.)     Diagnosis of asthma?   Child wakes during 2.5\" (1.588 m)   Wt 114 lb (51.7 kg)   LMP 08/04/2018 (Approximate)   Breastfeeding?  No   BMI 20.52 kg/m²     DIABETES SCREENING  BMI>85% age/sex  No And any two of the following:  Family History No   Ethnic Minority  No          Signs of Insulin [place] Respiratory Yes                   Diagnosis of Asthma: No Mental Health Yes        Currently Prescribed Asthma Medication:            Quick-relief  medication (e.g. Short Acting Beta Antagonist): No          Controller medication (e.g. inhaled corticostero